# Patient Record
Sex: MALE | Race: WHITE | URBAN - METROPOLITAN AREA
[De-identification: names, ages, dates, MRNs, and addresses within clinical notes are randomized per-mention and may not be internally consistent; named-entity substitution may affect disease eponyms.]

---

## 2017-03-22 ENCOUNTER — HISTORICAL (OUTPATIENT)
Dept: LAB | Facility: HOSPITAL | Age: 67
End: 2017-03-22

## 2017-04-17 ENCOUNTER — HISTORICAL (OUTPATIENT)
Dept: RADIOLOGY | Facility: HOSPITAL | Age: 67
End: 2017-04-17

## 2017-05-15 ENCOUNTER — HISTORICAL (OUTPATIENT)
Dept: LAB | Facility: HOSPITAL | Age: 67
End: 2017-05-15

## 2017-05-22 ENCOUNTER — HISTORICAL (OUTPATIENT)
Dept: LAB | Facility: HOSPITAL | Age: 67
End: 2017-05-22

## 2017-05-22 LAB — DEPRECATED CALCIDIOL+CALCIFEROL SERPL-MC: 43.79 NG/ML (ref 30–80)

## 2017-07-10 ENCOUNTER — HISTORICAL (OUTPATIENT)
Dept: ADMINISTRATIVE | Facility: HOSPITAL | Age: 67
End: 2017-07-10

## 2017-07-10 LAB — DEPRECATED CALCIDIOL+CALCIFEROL SERPL-MC: 67.61 NG/ML (ref 30–80)

## 2017-10-04 ENCOUNTER — HISTORICAL (OUTPATIENT)
Dept: ADMINISTRATIVE | Facility: HOSPITAL | Age: 67
End: 2017-10-04

## 2018-01-15 ENCOUNTER — HISTORICAL (OUTPATIENT)
Dept: LAB | Facility: HOSPITAL | Age: 68
End: 2018-01-15

## 2018-01-16 ENCOUNTER — HISTORICAL (OUTPATIENT)
Dept: ADMINISTRATIVE | Facility: HOSPITAL | Age: 68
End: 2018-01-16

## 2022-04-30 NOTE — H&P
Patient:   Kevin Mayorga Jr            MRN: [id number]            FIN: [id number]               Age:   67 years     Sex:  Male     :  1950   Associated Diagnoses:   None   Author:   Nubia GREENE, Deon      [facility] Status   The H&P was reviewed, the patient was examined, and there are no changes to the patient's condition..

## 2022-04-30 NOTE — OP NOTE
Patient:   Kevin Mayorga Jr            MRN: [id number]            FIN: [id number]               Age:   67 years     Sex:  Male     :  1950   Associated Diagnoses:   None   Author:   Deon Delacruz MD      Operative Note   DATE OF OPERATION:  2018    PREOPERATIVE DIAGNOSIS:  1.  L5-S1 central and lateral recess stenosis  2.  Right L5-S1 disc herniation versus synovial cyst    POSTOPERATIVE DIAGNOSIS:  1.  L5-S1 central and lateral recess stenosis  2.  Right L5-S1 synovial cyst    SURGEON:  Deon Delacruz M.D.   ASSISTANT: Christa Perez PA-C    PROCEDURE:  1.  Bilateral L 5 hemilaminotomies and right L5-S1 medial facetectomy and foraminotomy with the [employer] MD tubular retractor system  2.  Excision of extradural mass (adherent synovial cyst)  3.  Microdissection for spinal procedure    ANESTHESIA:  General endotracheal    BLOOD LOSS:  15 cc    SPECIMEN(s):  Synovial cyst    DRAINS:  JJ drain over the laminotomy defects    COMPLICATIONS:  None    HISTORY:  Kevin Mayorga is a 67-year-old gentleman with multiple previous lumbar surgeries.  He has done well since his last surgery but has had recent progressive and intractable right sided hip, buttock and leg pain in an L5-S1 radicular distribution.  Imaging studies showed significant lateral recess stenosis on the right as well as either a disc herniation or synovial cyst at L5-S1 on the right.  Options were discussed and, after conservative management failed, surgery was elected.  The patient understood and accepted the nature of this surgery as well as its attendant risks.    FINDINGS:  There were no untoward findings.  Exploration on the right side from a contralateral approach revealed no significant disc herniation.  However, there was significant synovial cyst in the lateral recess and up into the axilla of the exiting L5 nerve root.  This required tedious dissection from the dura where was adherent, but there was excellent lateral  recess decompression as well as decompression of the exiting nerve root at the completion of the procedure.  A drain was placed over the laminotomy defect and brought out through separate stab incision.  The patient tolerated the procedure well.    PROCEDURE IN DETAIL:  After endotracheal intubation and induction of general anesthesia, the patient was placed in the prone position on the spinal frame with pressure points appropriately padded.  The frame was cranked up and the back was prepped and draped in the usual fashion.  The patient received intravenous antibiotics prior to the start of the procedure.  The C-arm was used to guide the placement of the initial incision.    A 2 cm incision was made through the skin, subcutaneous tissues and fascia on the appropriate side after infiltrating the skin and muscle with 1/4% Marcaine containing 1/200,000 epinephrine.  Then progressive dilators were inserted down to the lamina and then a 16-18 mm guide tube was put into place and positioned appropriately according to the C-arm.  Then the operating microscope was brought into place.  Muscle was cleared off of the lateral inferior portion of the lamina and then the high speed drill, curette and Kerrison rongeurs were used to create a hemilaminotomy on the ipsilateral side.  The superior articulating facet of the inferior level was then undercut and then the ligamentum flavum was excised.  Once complete decompression of the lateral recess and proximal foramen was achieved, then the guide tube was angled medially, the base of the spinolaminar junction was removed with the high-speed drill and a combination of curettes and Kerrison rongeurs were then used to decompress the contralateral lateral recess and foramen.  At this point it was clear that there was a synovial cyst adherent to the dura near the axilla of the exiting L5 nerve root.  This was tediously removed and dissected away from the dura without any dural tear.  Once  this was decompressed exploration was carried out ventral to the thecal sac on the right side to ensure that there was no disc herniation.  Meticulous hemostasis was achieved with a combination of bipolar cautery and hemostatic agent which was removed at completion. The wound was irrigated copiously with antibiotic irrigating solution. When multiple levels are being addressed, the same procedure was carried out for each level. A drain was placed in the epidural space and brought out through a separate stab incision. Please refer to the Findings Section of this report for specific details regarding this patient.    The guide tube was removed slowly in a rotating fashion with bipolar cautery of the soft tissues and then the fascia was closed with 0 Vicryl and the subcutaneous tissue was closed with 2-0 Vicryl in separate layers.  Dermabond skin glue was used for the skin edges and the patient was returned to the supine position.  The patient was then taken to the post anesthetic care unit in satisfactory condition with correct sponge and needle counts.